# Patient Record
Sex: MALE | Race: AMERICAN INDIAN OR ALASKA NATIVE
[De-identification: names, ages, dates, MRNs, and addresses within clinical notes are randomized per-mention and may not be internally consistent; named-entity substitution may affect disease eponyms.]

---

## 2019-06-28 ENCOUNTER — HOSPITAL ENCOUNTER (EMERGENCY)
Dept: HOSPITAL 5 - ED | Age: 24
Discharge: HOME | End: 2019-06-28
Payer: COMMERCIAL

## 2019-06-28 VITALS — SYSTOLIC BLOOD PRESSURE: 128 MMHG | DIASTOLIC BLOOD PRESSURE: 93 MMHG

## 2019-06-28 DIAGNOSIS — F17.200: ICD-10-CM

## 2019-06-28 DIAGNOSIS — S60.032A: Primary | ICD-10-CM
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
## 2019-06-28 PROCEDURE — 99282 EMERGENCY DEPT VISIT SF MDM: CPT

## 2019-06-28 NOTE — EMERGENCY DEPARTMENT REPORT
ED Upper Extremity Inj HPI





- General


Chief Complaint: Extremity Injury, Upper


Stated Complaint: FINGER PAIN


Time Seen by Provider: 06/28/19 05:36


Source: patient


Mode of arrival: Ambulatory


Limitations: No Limitations





- History of Present Illness


MD Complaint: Injury to:: left, finger


-: minutes(s)


Other Extremity Injury: Fingers: Left


Other Injuries: none


Handedness: right


Place: home


Severity scale (0 -10): 6


Improves With: none


Worsens With: none, movement of extremity


Context: fall


Associated Symptoms: denies other symptoms


Treatments Prior to Arrival: other (none)





- Related Data


                                  Previous Rx's











 Medication  Instructions  Recorded  Last Taken  Type


 


Acetaminophen/Codeine [Tylenol 1 tab PO Q6H PRN #12 tab 06/28/19 Unknown Rx





/Codeine # 3 tab]    


 


Naproxen [Naprosyn] 500 mg PO BID PRN #30 tablet 06/28/19 Unknown Rx


 


cephALEXin [Keflex] 500 mg PO Q8HR 10 Days #30 cap 06/28/19 Unknown Rx














ED Review of Systems


ROS: 


Stated complaint: FINGER PAIN


Other details as noted in HPI





Constitutional: denies: chills, fever


Eyes: denies: eye pain, eye discharge, vision change


ENT: denies: ear pain, throat pain


Respiratory: denies: cough, shortness of breath, wheezing


Cardiovascular: denies: chest pain, palpitations


Endocrine: no symptoms reported


Gastrointestinal: denies: abdominal pain, nausea, diarrhea


Genitourinary: denies: urgency, dysuria


Musculoskeletal: joint swelling


Skin: denies: rash, lesions


Neurological: denies: headache, weakness, paresthesias


Psychiatric: denies: anxiety, depression


Hematological/Lymphatic: denies: easy bleeding, easy bruising





ED Past Medical Hx





- Past Medical History


Previous Medical History?: No





- Surgical History


Past Surgical History?: No





- Social History


Smoking Status: Current Every Day Smoker


Substance Use Type: None





- Medications


Home Medications: 


                                Home Medications











 Medication  Instructions  Recorded  Confirmed  Last Taken  Type


 


Acetaminophen/Codeine [Tylenol 1 tab PO Q6H PRN #12 tab 06/28/19  Unknown Rx





/Codeine # 3 tab]     


 


Naproxen [Naprosyn] 500 mg PO BID PRN #30 tablet 06/28/19  Unknown Rx


 


cephALEXin [Keflex] 500 mg PO Q8HR 10 Days #30 cap 06/28/19  Unknown Rx














ED Physical Exam





- General


Limitations: No Limitations


General appearance: alert, in no apparent distress





- Head


Head exam: Present: atraumatic, normocephalic





- Eye


Eye exam: Present: normal appearance, PERRL, EOMI


Pupils: Present: normal accommodation





- ENT


ENT exam: Present: mucous membranes moist





- Neck


Neck exam: Present: normal inspection, full ROM.  Absent: lymphadenopathy





- Respiratory


Respiratory exam: Present: normal lung sounds bilaterally.  Absent: respiratory 

distress, wheezes, stridor, chest wall tenderness





- Cardiovascular


Cardiovascular Exam: Present: regular rate, normal rhythm, normal heart sounds. 

 Absent: systolic murmur, diastolic murmur, rubs, gallop





- GI/Abdominal


GI/Abdominal exam: Present: soft.  Absent: distended, tenderness, bruit, hernia





- Rectal


Rectal exam: Present: deferred





- 


 exam: Present: normal inspection





- Extremities Exam


Extremities exam: Present: normal inspection, tenderness (left middle finger 

subugual hematomal), normal capillary refill, joint swelling.  Absent: pedal 

edema





- Expanded Upper Extremity Exam


  ** Left


Hand Wrist exam: Present: tenderness, subungual hematoma


Neuro motor exam: Present: wrist extension intact, thumb opposition intact, 

thumb IP flexion intact, thumb adduction intact, fingers 2-5 abduction intact


Neurosensory exam: Present: 2-point discrimination, radial nerve intact, ulnar 

nerve intact, median nerve intact


Vascular: Present: vascular compromise, normal capillary refill, radial pulse, 

brachial pulse, ulnar pulse.  Absent: pulse deficit radial art, pulse deficit 

ulnar art, pulse deficit brachial art





- Back Exam


Back exam: Present: normal inspection, full ROM.  Absent: tenderness, CVA 

tenderness (R), CVA tenderness (L), muscle spasm, paraspinal tenderness, 

vertebral tenderness, rash noted





- Neurological Exam


Neurological exam: Present: alert, oriented X3, CN II-XII intact, normal gait, 

reflexes normal.  Absent: motor sensory deficit





- Psychiatric


Psychiatric exam: Present: normal affect, normal mood





- Skin


Skin exam: Present: warm, dry, intact, normal color.  Absent: rash





ED Course





                                   Vital Signs











  06/28/19





  04:59


 


Temperature 97.9 F


 


Pulse Rate 90


 


Respiratory 18





Rate 


 


Blood Pressure 128/93


 


O2 Sat by Pulse 95





Oximetry 














- Procedure Description


Procedures done: left middle finger subugual hematoma site cleaned with betadine

 solution, anesthesia via digital block , incision via 11 blade scaple , 

moderate bloody output pressure relieved , sterile dressing applied, all 

bleeding is controlled, CMS remains intact.





ED Medical Decision Making





- Medical Decision Making


this is a subumgual hematomal left middle finger for i&d see procedure note, 

sterile dressing intact pt given wound care instructions verbalized agreementa 

dn und understanding of same pt for dc to home in stable condition at this time.

 


Critical care attestation.: 


If time is entered above; I have spent that time in minutes in the direct care 

of this critically ill patient, excluding procedure time.








ED Disposition


Clinical Impression: 


Subungual hematoma of digit of hand


Qualifiers:


 Encounter type: initial encounter Qualified Code(s): S60.10XA - Contusion of 

unspecified finger with damage to nail, initial encounter





Disposition: DC-01 TO HOME OR SELFCARE


Is pt being admited?: No


Does the pt Need Aspirin: No


Condition: Stable


Instructions:  Subungual Hematoma (ED)


Prescriptions: 


cephALEXin [Keflex] 500 mg PO Q8HR 10 Days #30 cap


Naproxen [Naprosyn] 500 mg PO BID PRN #30 tablet


 PRN Reason: Pain , Severe (7-10)


Acetaminophen/Codeine [Tylenol /Codeine # 3 tab] 1 tab PO Q6H PRN #12 tab


 PRN Reason: pain


Forms:  Work/School Release Form(ED)


Time of Disposition: 06:11